# Patient Record
Sex: FEMALE | Race: OTHER | HISPANIC OR LATINO | URBAN - METROPOLITAN AREA
[De-identification: names, ages, dates, MRNs, and addresses within clinical notes are randomized per-mention and may not be internally consistent; named-entity substitution may affect disease eponyms.]

---
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
## 2024-01-25 RX ORDER — BUPROPION HYDROCHLORIDE 150 MG/1
150 TABLET, EXTENDED RELEASE ORAL
Qty: 90 | Refills: 0 | Status: ACTIVE | COMMUNITY
Start: 2022-03-23 | End: 1900-01-01

## 2024-01-25 RX ORDER — SERTRALINE HYDROCHLORIDE 50 MG/1
50 TABLET, FILM COATED ORAL
Qty: 90 | Refills: 0 | Status: ACTIVE | COMMUNITY
Start: 2022-08-10 | End: 1900-01-01

## 2024-01-25 RX ORDER — SERTRALINE 25 MG/1
25 TABLET, FILM COATED ORAL
Qty: 90 | Refills: 0 | Status: ACTIVE | COMMUNITY
Start: 2022-10-27 | End: 1900-01-01

## 2024-03-19 ENCOUNTER — APPOINTMENT (OUTPATIENT)
Dept: PSYCHIATRY | Facility: CLINIC | Age: 19
End: 2024-03-19
Payer: SELF-PAY

## 2024-03-19 DIAGNOSIS — F32.4 MAJOR DEPRESSIVE DISORDER, SINGLE EPISODE, IN PARTIAL REMISSION: ICD-10-CM

## 2024-03-19 DIAGNOSIS — F41.9 ANXIETY DISORDER, UNSPECIFIED: ICD-10-CM

## 2024-03-19 PROCEDURE — 99213 OFFICE O/P EST LOW 20 MIN: CPT | Mod: 95

## 2024-03-19 NOTE — REASON FOR VISIT
[Telehealth (audio & video) - Individual/Group] : This visit was provided via telehealth using real-time 2-way audio visual technology. [Medical Office: ([facility])___] : The provider was located at the medical office in [unfilled]. [Home] : The patient, [unfilled], was located at home, [unfilled], at the time of the visit. [Patient] : Patient [FreeTextEntry1] : anxiety/mood

## 2024-03-19 NOTE — PLAN
[FreeTextEntry5] : -counseling and support provided -continue in weekly IT with Abena -continue Wellbutrin 150mg xr po daily. -will continue zoloft 75mg for residual symptoms of anxiety  -er/911 prn -f/u in 2-3 months

## 2024-03-19 NOTE — HISTORY OF PRESENT ILLNESS
[FreeTextEntry1] : Patient is a 16 year old female, single, unemployed, domiciled with biological mom and aunt, in 11th grade at [employer], with no PMHx and PPHx of depression, no previous psychiatric hospitalization, no previous suicide attempts, no history of self injurious behavior, currently in treatment with Abena thurston since Feb weekly, was referred to clinic for med management.  Since last visit, pt was continued on Wellbutrin and  zoloft .. She states she is doing really well. She just got back from  [facility] which she loves. She was going to go to [place] for spring break but she got pneumonia so she stayed home. She takes her meds regularly and they helps with her mood and her anxiety  No SI/HI. no lambert or psychosis. She is compliant with meds and  she denies any side effects.   Risk Assessment: Low risk for suicide/ aggression both acutely and chronically. RISK Factors: depression, recent loss of her father PROTECTIVE Factors: no previous attempt, no access to lethal means/ no access to firearm, no substance abuse, no legal history, no history of aggression, does not present with vindictive intent, no SI/HI, no psychosis, positive therapeutic relationship, engaged in school, reality testing intact, good social support, future oriented, no previous suicide attempts or violent history

## 2024-03-19 NOTE — SOCIAL HISTORY
[With Family] : lives with family [FreeTextEntry1] : PT was born and raised in [place]. Her childhood was good. Only child. She went [employer] and [employer].  She gets good grades so when grades started to decline in Dec, it was shocking. She took her sat last week. She has always had friends. She never got bullied. She never bullied anyone. She is in honors society, fashion club at school. She has never got into trouble at school

## 2024-07-03 ENCOUNTER — APPOINTMENT (OUTPATIENT)
Dept: PSYCHIATRY | Facility: CLINIC | Age: 19
End: 2024-07-03
Payer: SELF-PAY

## 2024-07-03 DIAGNOSIS — F41.9 ANXIETY DISORDER, UNSPECIFIED: ICD-10-CM

## 2024-07-03 DIAGNOSIS — F32.4 MAJOR DEPRESSIVE DISORDER, SINGLE EPISODE, IN PARTIAL REMISSION: ICD-10-CM

## 2024-07-03 PROCEDURE — 99214 OFFICE O/P EST MOD 30 MIN: CPT | Mod: 95

## 2024-08-07 ENCOUNTER — APPOINTMENT (OUTPATIENT)
Dept: PSYCHIATRY | Facility: CLINIC | Age: 19
End: 2024-08-07

## 2024-08-14 ENCOUNTER — APPOINTMENT (OUTPATIENT)
Dept: PSYCHIATRY | Facility: CLINIC | Age: 19
End: 2024-08-14
Payer: COMMERCIAL

## 2024-08-14 DIAGNOSIS — F32.4 MAJOR DEPRESSIVE DISORDER, SINGLE EPISODE, IN PARTIAL REMISSION: ICD-10-CM

## 2024-08-14 DIAGNOSIS — F41.9 ANXIETY DISORDER, UNSPECIFIED: ICD-10-CM

## 2024-08-14 PROCEDURE — 99213 OFFICE O/P EST LOW 20 MIN: CPT | Mod: 95

## 2024-08-14 NOTE — REASON FOR VISIT
[Telehealth (audio & video) - Individual/Group] : This visit was provided via telehealth using real-time 2-way audio visual technology. [Medical Office: ([facility])___] : The provider was located at the medical office in [unfilled]. [Home] : The patient, [unfilled], was located at home, [unfilled], at the time of the visit. [Verbal consent obtained from patient/other participant(s)] : Verbal consent for telehealth/telephonic services obtained from patient/other participant(s) [Patient] : Patient [FreeTextEntry1] : anxiety

## 2024-08-14 NOTE — HISTORY OF PRESENT ILLNESS
[FreeTextEntry1] : Patient is a 16 year old female, single, unemployed, domiciled with biological mom and aunt, in 11th grade at [employer], with no PMHx and PPHx of depression, no previous psychiatric hospitalization, no previous suicide attempts, no history of self injurious behavior, currently in treatment with Abena thurston since Feb weekly, was referred to clinic for med management.  Since last visit, pt was continued on Wellbutrin and dose was raised to 300mg. Pt states she feels so much better, more energy, better able to focus. She is leaving for school at the end of the week and she is excited.  She denies any anxiety at this time.  No SI/HI. no lambert or psychosis. She is compliant with meds and  she denies any side effects.   Risk Assessment: Low risk for suicide/ aggression both acutely and chronically. RISK Factors: depression, recent loss of her father PROTECTIVE Factors: no previous attempt, no access to lethal means/ no access to firearm, no substance abuse, no legal history, no history of aggression, does not present with vindictive intent, no SI/HI, no psychosis, positive therapeutic relationship, engaged in school, reality testing intact, good social support, future oriented, no previous suicide attempts or violent history

## 2024-08-14 NOTE — PLAN
[FreeTextEntry5] : counseling and support provided -continue in weekly IT with Abnea -continue Wellbutrin to 300mg xr po daily. risks and benefits discussed. pt feels low motivation. -er/911 prn -f/u in Nov-Dec When she is back from school.

## 2024-08-14 NOTE — SOCIAL HISTORY
[With Family] : lives with family [FreeTextEntry1] : PT was born and raised in [place]. Her childhood was good. Only child. She went [employer] and [employer].  She gets good grades so when grades started to decline in Dec, it was shocking. She took her sat last week. She has always had friends. She never got bullied. She never bullied anyone. She is in honors society, fashion club at school. She has never got into trouble at school

## 2024-11-27 ENCOUNTER — APPOINTMENT (OUTPATIENT)
Dept: PSYCHIATRY | Facility: CLINIC | Age: 19
End: 2024-11-27
Payer: COMMERCIAL

## 2024-11-27 DIAGNOSIS — F32.4 MAJOR DEPRESSIVE DISORDER, SINGLE EPISODE, IN PARTIAL REMISSION: ICD-10-CM

## 2024-11-27 DIAGNOSIS — F41.9 ANXIETY DISORDER, UNSPECIFIED: ICD-10-CM

## 2024-11-27 PROCEDURE — 99213 OFFICE O/P EST LOW 20 MIN: CPT | Mod: 95

## 2025-02-25 ENCOUNTER — APPOINTMENT (OUTPATIENT)
Dept: PSYCHIATRY | Facility: CLINIC | Age: 20
End: 2025-02-25
Payer: COMMERCIAL

## 2025-02-25 DIAGNOSIS — F32.4 MAJOR DEPRESSIVE DISORDER, SINGLE EPISODE, IN PARTIAL REMISSION: ICD-10-CM

## 2025-02-25 DIAGNOSIS — F41.9 ANXIETY DISORDER, UNSPECIFIED: ICD-10-CM

## 2025-02-25 PROCEDURE — 99213 OFFICE O/P EST LOW 20 MIN: CPT | Mod: 95

## 2025-06-03 ENCOUNTER — APPOINTMENT (OUTPATIENT)
Dept: PSYCHIATRY | Facility: CLINIC | Age: 20
End: 2025-06-03
Payer: COMMERCIAL

## 2025-06-03 DIAGNOSIS — F41.9 ANXIETY DISORDER, UNSPECIFIED: ICD-10-CM

## 2025-06-03 DIAGNOSIS — F32.4 MAJOR DEPRESSIVE DISORDER, SINGLE EPISODE, IN PARTIAL REMISSION: ICD-10-CM

## 2025-06-03 PROCEDURE — 99213 OFFICE O/P EST LOW 20 MIN: CPT | Mod: 95

## 2025-06-03 PROCEDURE — G2211 COMPLEX E/M VISIT ADD ON: CPT | Mod: NC,95
